# Patient Record
Sex: MALE | Race: WHITE | NOT HISPANIC OR LATINO | Employment: FULL TIME | URBAN - METROPOLITAN AREA
[De-identification: names, ages, dates, MRNs, and addresses within clinical notes are randomized per-mention and may not be internally consistent; named-entity substitution may affect disease eponyms.]

---

## 2017-05-09 ENCOUNTER — TRANSCRIBE ORDERS (OUTPATIENT)
Dept: ADMINISTRATIVE | Facility: HOSPITAL | Age: 27
End: 2017-05-09

## 2017-05-09 DIAGNOSIS — R06.83 SNORING: ICD-10-CM

## 2017-05-09 DIAGNOSIS — G47.10 PERSISTENT DISORDER OF INITIATING OR MAINTAINING WAKEFULNESS: Primary | ICD-10-CM

## 2017-05-23 ENCOUNTER — TRANSCRIBE ORDERS (OUTPATIENT)
Dept: SLEEP CENTER | Facility: CLINIC | Age: 27
End: 2017-05-23

## 2017-05-23 DIAGNOSIS — G47.33 OBSTRUCTIVE SLEEP APNEA (ADULT) (PEDIATRIC): Primary | ICD-10-CM

## 2017-09-21 ENCOUNTER — APPOINTMENT (OUTPATIENT)
Dept: URGENT CARE | Age: 27
End: 2017-09-21
Payer: OTHER MISCELLANEOUS

## 2017-09-21 PROCEDURE — G0382 LEV 3 HOSP TYPE B ED VISIT: HCPCS | Performed by: PREVENTIVE MEDICINE

## 2017-09-21 PROCEDURE — 99283 EMERGENCY DEPT VISIT LOW MDM: CPT | Performed by: PREVENTIVE MEDICINE

## 2017-10-31 ENCOUNTER — APPOINTMENT (OUTPATIENT)
Dept: URGENT CARE | Age: 27
End: 2017-10-31
Payer: OTHER MISCELLANEOUS

## 2017-10-31 PROCEDURE — 99213 OFFICE O/P EST LOW 20 MIN: CPT | Performed by: PREVENTIVE MEDICINE

## 2017-11-10 ENCOUNTER — ALLSCRIPTS OFFICE VISIT (OUTPATIENT)
Dept: OTHER | Facility: OTHER | Age: 27
End: 2017-11-10

## 2017-11-10 ENCOUNTER — TRANSCRIBE ORDERS (OUTPATIENT)
Dept: ADMINISTRATIVE | Facility: HOSPITAL | Age: 27
End: 2017-11-10

## 2017-11-10 DIAGNOSIS — G43.011 MIGRAINE WITHOUT AURA, WITH INTRACTABLE MIGRAINE, SO STATED, WITH STATUS MIGRAINOSUS: Primary | ICD-10-CM

## 2017-11-10 DIAGNOSIS — G43.011 INTRACTABLE MIGRAINE WITHOUT AURA AND WITH STATUS MIGRAINOSUS: ICD-10-CM

## 2017-12-15 ENCOUNTER — ALLSCRIPTS OFFICE VISIT (OUTPATIENT)
Dept: OTHER | Facility: OTHER | Age: 27
End: 2017-12-15

## 2017-12-15 DIAGNOSIS — G43.011 INTRACTABLE MIGRAINE WITHOUT AURA AND WITH STATUS MIGRAINOSUS: ICD-10-CM

## 2017-12-15 DIAGNOSIS — E55.9 VITAMIN D DEFICIENCY: ICD-10-CM

## 2017-12-15 DIAGNOSIS — F07.81 POSTCONCUSSIONAL SYNDROME: ICD-10-CM

## 2017-12-15 DIAGNOSIS — G44.309 POST-TRAUMATIC HEADACHE, NOT INTRACTABLE: ICD-10-CM

## 2017-12-20 ENCOUNTER — GENERIC CONVERSION - ENCOUNTER (OUTPATIENT)
Dept: NEUROLOGY | Facility: CLINIC | Age: 27
End: 2017-12-20

## 2018-01-13 VITALS
BODY MASS INDEX: 37.83 KG/M2 | SYSTOLIC BLOOD PRESSURE: 140 MMHG | WEIGHT: 304.25 LBS | HEIGHT: 75 IN | HEART RATE: 97 BPM | DIASTOLIC BLOOD PRESSURE: 90 MMHG

## 2018-01-16 ENCOUNTER — GENERIC CONVERSION - ENCOUNTER (OUTPATIENT)
Dept: OTHER | Facility: OTHER | Age: 28
End: 2018-01-16

## 2018-01-22 VITALS
HEIGHT: 75 IN | DIASTOLIC BLOOD PRESSURE: 88 MMHG | SYSTOLIC BLOOD PRESSURE: 159 MMHG | WEIGHT: 301.38 LBS | HEART RATE: 105 BPM | BODY MASS INDEX: 37.47 KG/M2

## 2018-01-23 NOTE — MISCELLANEOUS
Message  Return to work or school:   Faviola Rust is under my professional care  He was seen in my office on 12/15/2017    He is not able to return to work until cleared by neurology     No work until symptoms of his concussion have resolved  Kandi Ramachandran PA-C        Signatures   Electronically signed by : Jessy Urrutia, González Morales; Dec 15 2017  3:09PM EST                       (Author)

## 2018-01-24 VITALS
DIASTOLIC BLOOD PRESSURE: 88 MMHG | WEIGHT: 309.06 LBS | HEIGHT: 72 IN | BODY MASS INDEX: 41.86 KG/M2 | OXYGEN SATURATION: 98 % | HEART RATE: 111 BPM | SYSTOLIC BLOOD PRESSURE: 124 MMHG

## 2018-04-16 ENCOUNTER — TRANSCRIBE ORDERS (OUTPATIENT)
Dept: NEUROLOGY | Facility: CLINIC | Age: 28
End: 2018-04-16

## 2018-04-16 DIAGNOSIS — G43.011 HEADACHE, COMMON MIGRAINE, INTRACTABLE, WITH STATUS MIGRAINOSUS: ICD-10-CM

## 2018-04-16 DIAGNOSIS — G44.311 INTRACTABLE ACUTE POST-TRAUMATIC HEADACHE: ICD-10-CM

## 2018-04-16 DIAGNOSIS — F07.81 POSTCONCUSSION SYNDROME: Primary | ICD-10-CM

## 2018-04-16 NOTE — PROGRESS NOTES
Patient ID: Serge Mercado is a 33 y o  male  Assessment/Plan:    Postconcussion syndrome  Preventative:  Start Venlafaxine in am 1 tab for 14 days then increase to 2 tabs  Continue B2 and Magnesium    Abortive: At onset of worsening headache, use ibuprofen but limit to less than 3 times a week    Will refer to psychiatry to continue this medication and to determine if he needs any further medications  Continue his exercises at home for PT, OT and vision therapy  Tension-type headache, not intractable  Preventative:  Start Venlafaxine in am 1 tab for 14 days then increase to 2 tabs  Continue B2 and Magnesium    Abortive: At onset of worsening headache, use ibuprofen but limit to less than 3 times a week         Problem List Items Addressed This Visit        Nervous and Auditory    Postconcussion syndrome - Primary     Preventative:  Start Venlafaxine in am 1 tab for 14 days then increase to 2 tabs  Continue B2 and Magnesium    Abortive: At onset of worsening headache, use ibuprofen but limit to less than 3 times a week    Will refer to psychiatry to continue this medication and to determine if he needs any further medications  Continue his exercises at home for PT, OT and vision therapy  Relevant Medications    venlafaxine (EFFEXOR-XR) 37 5 mg 24 hr capsule       Other    Tension-type headache, not intractable     Preventative:  Start Venlafaxine in am 1 tab for 14 days then increase to 2 tabs  Continue B2 and Magnesium    Abortive:   At onset of worsening headache, use ibuprofen but limit to less than 3 times a week         Relevant Medications    venlafaxine (EFFEXOR-XR) 37 5 mg 24 hr capsule      Other Visit Diagnoses     Intractable acute post-traumatic headache        Headache, common migraine, intractable, with status migrainosus        Relevant Medications    venlafaxine (EFFEXOR-XR) 37 5 mg 24 hr capsule    Anxiety disorder, unspecified type        Relevant Medications venlafaxine (EFFEXOR-XR) 37 5 mg 24 hr capsule    Other Relevant Orders    Ambulatory referral to Psychiatry         Follow up prn    Subjective:    HPI     This is a worker comp case  Patient does understand that we will not be writing any letter or working with  in their behalf  However we will try to provide the best medical care possible  We had the pleasure of evaluating Dia Clifton in neurological follow up today  As you know he is a 28year old right handed gentleman who suffered a concussion on 9/18/2017 at work  He is a  and struck his head across a beam  No LOC but had to sit down because he saw starts  After 10 minutes he had dizziness and felt unsteady  Has completed vision therapy, PT and OT  Patient reports he has been out of medicine a few weeks  Compared with before the injury, patient today presents with:  Headaches, Feelings of dizziness, Sleep disturbances, Fatigue, tiring more easily, Being irritable, Forgetfulness, poor memory, Poor Concentration, Taking longer to think, Light sensitivity, Double vision, Restlessness  Are you experiencing any other difficulties?  no    American Academy of Neurology Concussion Grading Scale:  Grade 2  - Transient confusion  - No loss of consciousness  - Concussion symptoms or mental status abnormalities last more than 15 minutes  Accident or injury prior to onset of symptoms: see HPI    Headache History:  What is your current pain level - 5/10  How often do the headaches occur (daily/weekly/monthly) - daily  What time of the day do the headaches start - variable  How long do the headaches last - hours to rest of day  Are you ever headache free - yes  Where are they located -frontal and retro-orbital spreading to posterior  What is the intensity of pain - 3-7-8/10, average about a 5/10, gets to 7-8 /10 twice  aweek  Any warning prior to headache and how long do they last - no  Describe your usual headache - Pressure, Pounding, Dull, Throbbing  Associated symptoms: photophobia, lacrimation, blurred vision, runny or stuffed-up nose, light-headed or dizzy, prefer to be alone and in a dark room, unable to work  Headache are worse if the patient: head movement, lights, concentration  Headache trigger: no  Have you seen someone else for headaches or pain - [facility]  Have you had trigger point injection performed and how often - no  Have you had Botox injection performed and how often - no  What medications do you take or have you taken for your headaches? PREVENTIVE: Amitriptyline, CoQ10, Magnesium, Riboflavin, gabapentin  ABORTIVE: prednisone, naproxen  Non-Medical/Alternative Treatments used in the past for headaches:   11/24/2017 normal MRI brain  OT and PT -balance, cognitive, work related activities-has been released and told to continue at home    The following portions of the patient's history were reviewed and updated as appropriate: allergies, current medications, past family history, past medical history, past social history, past surgical history and problem list          Objective:    Blood pressure 138/86, pulse 80, height 6' (1 829 m), weight (!) 144 kg (318 lb 8 oz)  Physical Exam   Constitutional: He is oriented to person, place, and time  He appears well-developed and well-nourished  HENT:   Head: Normocephalic and atraumatic  Eyes: EOM are normal  Pupils are equal, round, and reactive to light  Neck: Neck supple  Cardiovascular: Normal rate  Pulmonary/Chest: Effort normal    Abdominal: Soft  Musculoskeletal: Normal range of motion  Neurological: He is alert and oriented to person, place, and time  He has normal strength and normal reflexes  Gait and coordination normal    Skin: Skin is warm and dry  Psychiatric: He has a normal mood and affect  His speech is normal    Nursing note and vitals reviewed        Neurological Exam    Mental Status  The patient is alert and oriented to person, place, time, and situation  His recent and remote memory are normal  He has no visuospatial neglect  His speech is normal  His language is fluent with no aphasia  He has normal attention span and concentration  He has a normal fund of knowledge  Cranial Nerves    CN II: The patient's visual acuity and visual fields are normal   CN III, IV, VI: The patient's pupils are equally round and reactive to light and ocular movements are normal   CN V: The patient has normal facial sensation  CN VII:  The patient has symmetric facial movement  CN VIII:  The patient's hearing is normal   CN IX, X: The patient has symmetric palate movement and normal gag reflex  CN XI: The patient's shoulder shrug strength is normal   CN XII: The patient's tongue is midline without atrophy or fasciculations  Motor  The patient has normal muscle bulk throughout  His overall muscle tone is normal throughout  His strength is 5/5 throughout all four extremities  Sensory  The patient's sensation is normal in all four extremities  He has normal cortical sensation    Reflexes  Deep tendon reflexes are 2+ and symmetric in all four extremities with downgoing toes bilaterally  Gait and Coordination  The patient has normal gait and station and normal casual, toe, heel, and tandem gait  He has normal coordination bilaterally  Patient is able to perform tandem gait with self-corrections  He is able to balance on one foot despite being overly dramatic with arm and body movement         ROS:    Review of Systems   Constitutional: Negative  HENT: Negative  Eyes: Positive for pain  Respiratory: Negative  Cardiovascular: Negative  Gastrointestinal: Negative  Endocrine: Negative  Genitourinary: Negative  Musculoskeletal: Negative  Skin: Negative  Allergic/Immunologic: Negative  Neurological: Positive for light-headedness and headaches  Hematological: Negative      Psychiatric/Behavioral: Positive for sleep disturbance

## 2018-04-19 ENCOUNTER — OFFICE VISIT (OUTPATIENT)
Dept: NEUROLOGY | Facility: CLINIC | Age: 28
End: 2018-04-19
Payer: OTHER MISCELLANEOUS

## 2018-04-19 VITALS
HEIGHT: 72 IN | SYSTOLIC BLOOD PRESSURE: 138 MMHG | HEART RATE: 80 BPM | BODY MASS INDEX: 42.66 KG/M2 | WEIGHT: 315 LBS | DIASTOLIC BLOOD PRESSURE: 86 MMHG

## 2018-04-19 DIAGNOSIS — F07.81 POSTCONCUSSION SYNDROME: Primary | ICD-10-CM

## 2018-04-19 DIAGNOSIS — G44.209 TENSION-TYPE HEADACHE, NOT INTRACTABLE, UNSPECIFIED CHRONICITY PATTERN: ICD-10-CM

## 2018-04-19 DIAGNOSIS — G44.311 INTRACTABLE ACUTE POST-TRAUMATIC HEADACHE: ICD-10-CM

## 2018-04-19 DIAGNOSIS — F41.9 ANXIETY DISORDER, UNSPECIFIED TYPE: ICD-10-CM

## 2018-04-19 DIAGNOSIS — G43.011 HEADACHE, COMMON MIGRAINE, INTRACTABLE, WITH STATUS MIGRAINOSUS: ICD-10-CM

## 2018-04-19 PROCEDURE — 99214 OFFICE O/P EST MOD 30 MIN: CPT | Performed by: PHYSICIAN ASSISTANT

## 2018-04-19 RX ORDER — VENLAFAXINE HYDROCHLORIDE 37.5 MG/1
37.5 CAPSULE, EXTENDED RELEASE ORAL DAILY
Qty: 60 CAPSULE | Refills: 1 | Status: SHIPPED | OUTPATIENT
Start: 2018-04-19

## 2018-04-19 NOTE — LETTER
April 19, 2018     Patient: Adeola Cardoso   YOB: 1990   Date of Visit: 4/19/2018       To Whom it May Concern:    Adeola Cardoso is under my professional care  He was seen in my office on 4/19/2018  He may return to work on 4/23/2018 without any limitations  If you have any questions or concerns, please don't hesitate to call           Sincerely,          Edin Reyes PA-C        CC: No Recipients

## 2018-04-19 NOTE — ASSESSMENT & PLAN NOTE
Preventative:  Start Venlafaxine in am 1 tab for 14 days then increase to 2 tabs  Continue B2 and Magnesium    Abortive: At onset of worsening headache, use ibuprofen but limit to less than 3 times a week    Will refer to psychiatry to continue this medication and to determine if he needs any further medications  Continue his exercises at home for PT, OT and vision therapy

## 2018-04-19 NOTE — ASSESSMENT & PLAN NOTE
Preventative:  Start Venlafaxine in am 1 tab for 14 days then increase to 2 tabs  Continue B2 and Magnesium    Abortive:   At onset of worsening headache, use ibuprofen but limit to less than 3 times a week

## 2018-04-19 NOTE — PATIENT INSTRUCTIONS
Preventative:  Start Venlafaxine in am 1 tab for 14 days then increase to 2 tabs  Continue B2 and Magnesium    Abortive: At onset of worsening headache, use ibuprofen but limit to less than 3 times a week    Will refer to psychiatry to continue this medication and to determine if he needs any further medications  Continue his exercises at home for PT, OT and vision therapy  May take these over-the-counter supplements to decrease intensity and frequency of migraines  - Magnesium Oxide 400-500 mg a day  If any diarrhea or upset stomach, decrease dose  as tolerated  -  B2 200 mg a day  May take once a day in am  This supplement will change the color of the urine to fluorescent yellow no matter how hydrated, which is normal      Discussed side effects of all medications prescribed today to the patient in detail  Patient education was completed today and we also discussed precautions for rebound headaches  When patient has a moderate to severe headache, they should seek rest, initiate relaxation and apply cold compresses to the head  1  Maintain regular sleep schedule  Adults need at least 7-8 hours of uninterrupted a night  2  Limit over the counter medications such as Tylenol, Ibuprofen, Aleve, Excedrin  (No more than 3 times a week)  3  Maintain headache diary  We discussed an TOMAS for a smart phone is "Migraine eDiary"  4  Limit caffeine to 1-2 cups a day or less  5  Avoid dietary trigger  (list given to the patient and reviewed with them)  6  Patient is to have regular frequent meals to prevent headache onset  7   Please drink at least 64 ounces of water a day to help remain hydrated        Clearance for work given  Follow up with [facility]

## 2023-05-19 NOTE — CONSULTS
-- DO NOT REPLY / DO NOT REPLY ALL --  -- Message is from [facility] ([facility]) --    General Patient Message: Patient is calling to cancel his appointment that is on 5/23/23.Patient would like a call back to reschedule.     Caller Information       Type Contact Phone/Fax    05/19/2023 01:42 PM CDT Phone (Incoming) Franky Patterson (Self) [phone] (M)        Alternative phone number: none    Can a detailed message be left? Yes    Message Turnaround:     Is it Working Hours? Yes - Working Hours     [place]:    Please give this turnaround time to the caller:   \"This message will be sent to [state Provider's name]. The clinical team will fulfill your request as soon as they review your message.\"                 Assessment   1  Post concussion syndrome (310 2) (F07 81)  2  Post-concussion headache (339 20) (G44 309)  3  Intractable migraine without aura and with status migrainosus (346 13) (G43 011)    Plan   Intractable migraine without aura and with status migrainosus    · Start: Amitriptyline HCl - 25 MG Oral Tablet; TAKE 1 TABLET DAILY at 6-7 PM  Rx By: Jose Alejandro Singh; Dispense: 30 Days ; #:30 Tablet; Refill: 3;For: Intractable migraine     without aura and with status migrainosus; KYRA = N; Rx auto-faxed to [address] [place] [address] [employer] [facility] [employer]; Last Updated By: System, [employer]; 11/10/2017 4:21:12 PM   · Start: CoQ-10 100 MG Oral Capsule Extended Release; TAKE 1 CAPSULE 3 times daily  Rx By: Jose Alejandro Singh; Dispense: 30 Days ; #:90 Capsule Extended Release; Refill: 0;For:     Intractable migraine without aura and with status migrainosus; KYRA = N; Rx auto-faxed     to [address] [place] [address]; Last Updated By: System, [employer]Script[employer];     11/10/2017 4:21:13 PM   · Start: Magnesium Oxide 400 MG Oral Tablet; TAKE 1 TABLET TWICE DAILY  Rx By: Jose Alejandro Singh; Dispense: 30 Days ; #:60 Tablet; Refill: 3;For: Intractable migraine     without aura and with status migrainosus; KYRA = N; Rx auto-faxed to [address] [place] [address] [employer] [facility]; Last Updated By: System, [employer]; 11/10/2017 4:21:14 PM   · Start: Naproxen 375 MG Oral Tablet; TAKE 1 TABLET EVERY 6 TO 8 HOURS AS NEEDED  Rx By: Jose Alejandro Singh; Dispense: 30 Days ; #:75 Tablet; Refill: 2;For: Intractable migraine     without aura and with status migrainosus; KYRA = N; Rx auto-faxed to [address] [place] [address] [employer]; Last Updated By: System, SureScripts; 11/10/2017 4:21:12 PM   · Start: PredniSONE 10 MG Oral Tablet; TAKE 6 TABS DAILY IN AM FOR 5 DAYS THEN    TAPER BY ONE TABLET EACH SUBSEQUENT DAY UNTIL FINISHED  Rx By: Jose Alejandro Singh; Dispense: 10 Days ; #:45 Tablet;  Refill: 0;For: Intractable migraine     without aura and with status migrainosus; KYRA = N; Rx auto-faxed to [address] [facility] [address]; Last Updated By: System, [employer]Script[employer]; 11/10/2017 4:21:14 PM   · Start: Riboflavin 400 MG Oral Tablet; Take 1 tablet daily  Rx By: Julien Guaman; Dispense: 30 Days ; #:30 Tablet; Refill: 3;For: Intractable migraine     without aura and with status migrainosus; KYRA = N; Rx auto-faxed to [address] [facility]; Last Updated By: System, SureScripts; 11/10/2017 4:21:13 PM   · (1) BASIC METABOLIC PROFILE; Status:Active; Requested for:10Nov2017;   Perform:[facility]; Due:10Nov2018; Ordered; For:Intractable migraine     without aura and with status migrainosus; Ordered By:Jorge Gutierrez;   · (1) CBC/ PLT (NO DIFF); Status:Active; Requested for:10Nov2017;   Perform:[facility]; Due:10Nov2018; Ordered; For:Intractable migraine     without aura and with status migrainosus; Ordered By:Jorge Gutierrez;   · (1) THYROXINE; Status:Active; Requested for:10Nov2017;   Perform:[facility]; Due:10Nov2018; Ordered; For:Intractable migraine     without aura and with status migrainosus; Ordered By:Jorge Gutierrez;   · (1) TSH; Status:Active; Requested for:10Nov2017;   Perform:[facility]; Due:10Nov2018; Ordered; For:Intractable migraine     without aura and with status migrainosus; Ordered By:Jorge Gutierrez;   · (LC) Vitamin B12 and Folate; Status:Active; Requested for:10Nov2017;   Perform:[facility]; Due:10Nov2018; Ordered; For:Intractable migraine without aura and     with status migrainosus; Ordered By:Jorge Gutierrez;   · * MRI BRAIN W WO CONTRAST; Status:Need Information - Financial Authorization; Requested for:10Nov2017;   Perform:[facility]; NWQ:[id number]; Ordered; For:Intractable migraine without     aura and with status migrainosus; Ordered By:Jorge Gutierrez;   · Follow-up visit in 6 weeks Evaluation and Treatment  Follow-up  Status: Hold For -    Scheduling  Requested for: [id number]  Ordered; For: Intractable migraine without aura and with status migrainosus; Ordered By:     Mita Clark  Performed:   Due: [id number]    Discussion/Summary   Discussion Summary:    He will not be returning to work until after his re-evaluation  He is suffering from post concussion syndrome and post concussion headaches  Counseling Documentation With Imm: The patient was counseled regarding instructions for management,-- prognosis,-- risks and benefits of treatment options,-- importance of compliance with treatment  Chief Complaint   Chief Complaint Free Text Note Form: Patient present for Neuro Eval;      History of Present Illness   HPI: Patient is a 50-year-old man suffering a concussion on September 18, 2017 while at work  The patient is a , 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  He did not lose consciousness but âhad to sit down 1st short while after the injury and âsaw starsâ  He had about a 10 minutes episode of some recovery and then began to feel unsteady and dizzy  He was lightheaded, occasionally seeing double but having trouble focusing as a more accurate description of his visual disturbance  Soon after the injury, he began having headaches  These are generally frontal and retro-orbital spreading posteriorly to the mid vertex area  The they are generally pressure-like, worsened with bending over and reach a pounding headache discomfort on most days  Bright lights, activity and stress will increase the headaches  Loud sounds are difficult for him to tolerate  He gets mild nausea but this is decreasing  He vomited in the past but not now  Dizziness is decreasing slowly as well  He can ambulate well but notes that he frequently will have difficulty with fine motor activities on his feet  He has not returned to work at this point  He did have a CT scan of the brain when he presented to the hospital at [address] [place] [address] after the injury  That was negative  headache is constant   He takes ibuprofen, 100 mg, with no help  He does not have incoordination  Bowel and bladder function are normal  He has neck pain but no arm radiating pain  He is sleeping poorly  There are no significant exacerbating or remitting factors that he can relate other than what is described above  Current Meds   1  No Reported Medications Recorded    Vitals   Signs   Recorded: [id number] 03:26PM   Heart Rate: 97  Systolic: 100  Diastolic: 90  Height: 6 ft 2 5 in  Weight: 304 lb 4 oz  BMI Calculated: 38 54  BSA Calculated: 2 61    Physical Exam        Constitutional      General Appearance: Appears appropriate for age, healthy, well developed, appropriately groomed and appropriately dressed -- He appears uncomfortable in the bright lights of the room  Head and Face      Head and face: Atraumatic on inspection  Facial strength normal       Eyes      Ophthalmoscopic examination: Vision is grossly normal  Gross visual field testing by confrontation shows no abnormalities  EOMI in both eyes  Conjunctivae clear  Eyelids normal palpebral fissures equal  Orbits exhibit normal position  No discharge from the eyes  PERRL  External inspection of ears and nose: Normal          Neck      Neck and thyroid: Normal to inspection and palpation  Pulmonary      Respiratory effort: Lungs are clear bilaterally  Auscultation of lungs: Clear to auscultation  Cardiovascular      Auscultation of heart: Rate is regular  Rhythm is regular  Carotid pulses: Normal, 2+ bilaterally  Peripheral vascular exam: Normal pulses throughout, no tenderness, erythema or swelling  Musculoskeletal      Gait and Station: Walks with normal gait  Tandem walk test is normal  Romberg's test is negative  -- Difficulty with tandem but not faling  Muscle strength: Normal strength throughout  Muscle tone: No atrophy, abnormal movements, flaccidity, cogwheeling or spasticity         Range of motion: Normal      Stability: Normal  Inspection of temporomandibular joint appears normal        Neurologic      Orientation to person, place, and time: Normal        Attention span and concentration: Normal thought process and attention span  Language: Names objects, able to repeat phrases and speaks spontaneously  Sensation: Intact sensation to pinprick, temperature, vibration, and proprioception in all four extremities  Reflexes: DTR's are normal and symmetric bilaterally  Babinski's reflex is negative bilaterally  No pathologic ankle clonus  Coordination: Cerebellum function intact  No involuntary movement or psychomotor activity  Motor System: No pronator drift  Upper Extremities: Normal to inspection  No tenderness over the upper extremities bilaterally  No instability bilaterally  Strength: Motor strength is 5/5 bilaterally  Normal muscle tone bilaterally  Muscle bulk: Muscle bulk is normal bilaterally  Full ROM bilaterally  Lower Extremities: Normal to inspection and palpation  No tenderness of the lower extremities bilaterally  Exhibits no instability bilaterally  Strength: Motor strength is 5/5 bilaterally  Normal muscle tone bilaterally  Muscle Bulk: Muscle bulk is normal bilaterally  Full ROM bilaterally  Cranial Nerve Exam      II: Normal with no deficit  III,IV, VI: Normal with no deficit  V: Normal with no deficit  VII: Normal with no deficit  VIII: Normal with no deficit  IX: Normal with no deficit  X: Normal with no deficit  XI: Normal with no deficit  XII: Normal with no deficit  Message        Return to work or school: Raul Titus is under my professional care   He was seen in my office on1  11/10/171      He is not able to return to work until1  Reevaluated in 6 weeks1              1 Amended By: Livia Oliver; Nov 10 2017 4:30 PM EST      Signatures    Electronically signed by : Jhonnie Najjar, DO; Nov 10 2017  4:27PM EST (Author)     Electronically signed by :  Robert Daniel, ; Jan 11 2018  3:26PM EST                       (Administrative)